# Patient Record
Sex: MALE | Race: WHITE | NOT HISPANIC OR LATINO | Employment: FULL TIME | URBAN - METROPOLITAN AREA
[De-identification: names, ages, dates, MRNs, and addresses within clinical notes are randomized per-mention and may not be internally consistent; named-entity substitution may affect disease eponyms.]

---

## 2023-07-19 ENCOUNTER — OFFICE VISIT (OUTPATIENT)
Dept: VASCULAR SURGERY | Facility: CLINIC | Age: 83
End: 2023-07-19
Payer: COMMERCIAL

## 2023-07-19 DIAGNOSIS — I83.892 HEMORRHAGE OF VARICOSE VEINS OF LEFT LOWER EXTREMITY: ICD-10-CM

## 2023-07-19 DIAGNOSIS — I83.899 SYMPTOMATIC SPIDER VARICOSE VEIN: Primary | ICD-10-CM

## 2023-07-19 DIAGNOSIS — I83.812 VARICOSE VEINS OF LEG WITH PAIN, LEFT: ICD-10-CM

## 2023-07-19 PROCEDURE — 99203 OFFICE O/P NEW LOW 30 MIN: CPT | Performed by: SURGERY

## 2023-07-19 RX ORDER — LATANOPROST 50 UG/ML
SOLUTION/ DROPS OPHTHALMIC
COMMUNITY
Start: 2023-06-17

## 2023-07-19 RX ORDER — SIMVASTATIN 10 MG
TABLET ORAL
COMMUNITY
Start: 2023-04-27

## 2023-07-19 RX ORDER — ASCORBIC ACID/MULTIVIT-MIN 1000 MG
EFFERVESCENT POWDER IN PACKET ORAL
COMMUNITY
Start: 2022-09-08

## 2023-07-19 RX ORDER — MULTIVITAMIN
TABLET ORAL
COMMUNITY

## 2023-07-19 RX ORDER — FLECAINIDE ACETATE 100 MG/1
100 TABLET ORAL EVERY 12 HOURS
COMMUNITY
Start: 2023-02-15

## 2023-07-19 RX ORDER — METOPROLOL SUCCINATE 50 MG/1
TABLET, EXTENDED RELEASE ORAL
COMMUNITY
Start: 2023-06-18

## 2023-07-19 RX ORDER — GLUCOSAMINE/CHONDR SU A SOD 750-600 MG
TABLET ORAL
COMMUNITY

## 2023-07-19 RX ORDER — DOXYCYCLINE HYCLATE 50 MG/1
50 CAPSULE ORAL AT BEDTIME
COMMUNITY

## 2023-07-19 ASSESSMENT — ENCOUNTER SYMPTOMS: IRREGULAR HEARTBEAT: 1

## 2023-07-19 NOTE — PROGRESS NOTES
VEIN SOLUTIONS CONSULT    Impression:   1.  Mid left pretibial cluster of spider veins with history of hemorrhage.    2.  Symptomatic left leg varicosities with pain and swelling.    3.  Atrial fibrillation anticoagulated with Eliquis.    Plan:  I had a detailed conversation with Jerry regarding all of the above.  I will arrange for a left leg venous competency study.  Based on physical exam I suspect that he has incompetence of the left greater saphenous vein.  He has a small eschar on the mid left pretibial region with some surrounding spider veins.  I will seek approval for medically necessary sclerotherapy given his history of hemorrhage.  He has utilized knee-high compression stockings of 20-30 mmHg pressure for greater than 3 months.  He will continue to utilize those stockings as we await his left leg venous competency study.  Ultimate treatment recommendations will be pending the outcome of that exam.        HPI:   Jerry Dubon is a healthy 82-year-old gentleman with longstanding history of slowly worsening left leg varicosities.  He has chronic atrial fibrillation and is anticoagulated with Eliquis.  2 months ago he was drying his legs off after a shower when he avulses a scab on the mid left pretibial region and had significant bleeding from that area.  Ultimately he went to a local emergency room where the bleeding was controlled with direct pressure and application of skin glue.    He has been self treating with a daily compression dressing utilizing gauze and Coban over the cluster of spider veins on the mid left pretibial region.  Additionally he has been utilizing a knee-high compression stocking of 20-30 mmHg pressure.  He has no prior history of venous intervention.      CURRENT MEDICATIONS  No current outpatient medications on file prior to visit.  No current facility-administered medications on file prior to visit.        PAST MEDICAL HISTORY  No past medical history on file.      PAST  SURGICAL HISTORY:  No past surgical history on file.    ALLERGIES   Not on File    FAMILY HISTORY  No family history on file.    SOCIAL HISTORY       ROS:   Review of Systems   Cardiovascular: Positive for irregular heartbeat and leg swelling.   All other systems reviewed and are negative.        EXAM:  There were no vitals taken for this visit.  Physical Exam  Constitutional:       Appearance: Normal appearance.   HENT:      Head: Normocephalic and atraumatic.   Eyes:      General: No scleral icterus.     Extraocular Movements: Extraocular movements intact.      Pupils: Pupils are equal, round, and reactive to light.   Cardiovascular:      Pulses:           Dorsalis pedis pulses are 2+ on the right side and 2+ on the left side.      Comments: Significant varicosities on the medial aspect of the left knee and calf.  2 mm eschar with some fine surrounding spider veins on the anterior aspect of the mid left pretibial region.  This was the area of prior hemorrhage.  Musculoskeletal:         General: Normal range of motion.   Skin:     General: Skin is warm and dry.   Neurological:      General: No focal deficit present.      Mental Status: He is alert and oriented to person, place, and time. Mental status is at baseline.   Psychiatric:         Mood and Affect: Mood normal.         Behavior: Behavior normal.         Thought Content: Thought content normal.         Judgment: Judgment normal.         Labs:  LIPID RESULTS:  No results found for: CHOL, HDL, LDL, TRIG, CHOLHDLRATIO    CBC RESULTS:  No results found for: WBC, RBC, HGB, HCT, MCV, MCH, MCHC, RDW, PLT    BMP RESULTS:  No results found for: NA, POTASSIUM, CHLORIDE, CO2, ANIONGAP, GLC, BUN, CR, GFRESTIMATED, GFRESTBLACK, CAMILA     A1C RESULTS:  No results found for: A1C      Imaging:  No results found for this or any previous visit (from the past 24 hour(s)).    Total length of this encounter was 30 minutes with time spent reviewing records, interviewing and  examining the patient, answering questions, and coordinating a treatment plan.          Deacon Cesar MD

## 2023-07-19 NOTE — PATIENT INSTRUCTIONS
Varicose Veins and Spider Veins    Varicose veins are swollen, enlarged veins most often found in the legs. They are usually blue or purple in color and may bulge, twist, and stand out under the skin. Spider veins are small veins just under your skin that can look red, blue or purple.        Normally, veins return blood from the body to the heart. The leg veins have one-way valves that prevent blood from flowing backward in the vein. When the valves are weak or damaged, blood backs up in the veins. This may cause some of the veins to swell and bulge and become varicose veins.     Symptoms  Varicose veins may or may not cause symptoms. If symptoms do occur, they can include:  Legs that feel tired, achy, heavy, or itchy  Leg swelling  Leg muscle cramps  Skin changes, such as discoloration, dryness, redness, or rash (in more severe cases, you may also have sores on the skin called venous leg ulcers)    Risk factors  There are a number of factors that increase the risk for varicose veins. These can include:   Being a woman  Being older  Sitting or standing for long periods  Being overweight  Being pregnant  Having a family history of varicose veins  Hormones, birth control pills    Treatment starts with simple self-help measures (see below). If these don't help, there are many procedures that can be done to shrink or remove varicose veins. Your healthcare provider can tell you more about these options, if needed.     Home care  Support or compression stockings will likely be prescribed. If so, be sure to wear them as directed. They may help improve blood flow.  Exercising helps strengthen your leg muscles and improve blood flow. To get the most benefit, choose exercises such as walking, swimming, or cycling. Also try to exercise for at least 30 minutes on most days.  Raising your legs above heart level will help relieve swelling and keep blood from pooling in veins. Try to elevate your legs for 15 to 20 minutes at  the end of the day, and whenever you're relaxing. To make sure your legs are raised above heart level, prop them up on cushions or large pillows.  To keep blood moving when you have to sit or stand for long periods, try these tips:  At work, take walking breaks instead of coffee breaks. Walk during your lunch hour. Or try flexing your feet up and down 10 times each hour.  When standing, raise yourself up and down on your toes, or rock back and forth on your heels.  If you are overweight, talk with your healthcare provider about setting up a weight-loss plan. Maintaining a healthy weight can help reduce the strain on your veins. It may also improve symptoms, such as swelling and aching.  If you have dryness and itching, ask your provider about special lotions that can be applied to the skin to help improve symptoms.     Follow-up care  Follow up with your healthcare provider, or as directed. If imaging tests were done, you'll be told the results and if there are any new findings that affect your care.     When to seek medical advice  Call your healthcare provider right away if any of these occur:  Sudden, severe leg swelling, pain, or redness  Symptoms worsen, or they don't improve with self-care  Bleeding from any affected veins  Ulcers form on the legs, ankles, or feet  Fever of 100.4 F (38 C) or higher, or as advised by your provider    Denny last reviewed this educational content on 4/1/2018 2000-2021 [employer]. All rights reserved. This information is not intended as a substitute for professional medical care. Always follow your healthcare professional's instructions.    Self-Care for Spider and Varicose Veins    Your healthcare provider may suggest that you try self-care. Exercising and maintaining a healthy weight may keep problem veins from getting worse. Wearing elastic stockings and elevating your legs can help improve blood flow. Taking breaks when you sit or stand helps,  too.        Wearing compression stockings  Compression stockings gently squeeze veins so blood flows upward. If you need compression stockings, your healthcare provider can prescribe them for you. Follow your healthcare provider's advice about how and when to wear them. Compression stockings come in several different levels of pressure. Ask your healthcare provider which level of pressure would help you the most.     Raising your legs above heart level will help relieve swelling and keep blood from pooling in veins. Try to elevate your legs for 15 to 20 minutes at the end of the day, and whenever you're relaxing. To make sure your legs are raised above heart level, prop them up on cushions or large pillows.    To keep blood moving when you have to sit or stand for long periods, try these tips:  At work, take walking breaks instead of coffee breaks. Walk during your lunch hour. Or try flexing your feet up and down 10 times each hour.  When standing, raise yourself up and down on your toes, or rock back and forth on your heels.    [employer] last reviewed this educational content on 11/1/2019 2000-2021 [employer]. All rights reserved. This information is not intended as a substitute for professional medical care. Always follow your healthcare professional's instructions.    Treatment Options    Sclerotherapy  Your healthcare provider will inject the vein with a special chemical that will quickly close the vein from the inside. This is particularly useful for spider veins and smaller varicose veins.      If you have large varicose veins, surgery may be the best choice. But it will not prevent new varicose veins from forming. Surgery is most often done in a surgery center or in the office.    If surgery is recommended for you, your surgery will be tailored to your needs. Varicose veins may be tied off (ligation), destroyed, or removed. Blood will then flow through the healthy veins. One or more of the  following techniques may be used:    Ablation (laser or radiofrequency)  A tiny cut in the skin is made near the varicose vein. A small tube called a catheter is inserted into the vein. Energy or heat released from the catheter tip will make the vein walls collapse and stick together, stopping all blood flow through the vein.         Ablation (glue)  A tiny cut in the skin is made near the varicose vein. A small tube called a catheter is inserted into the vein. Droplets of glue are deposited into the vein to make vein walls collapse and stick together stopping blood flow through the vein.    Microphlebectomy or ambulatory phlebectomy  A special hook is used to gently take out a varicose vein through tiny incisions. Microphlebectomy may be done in your healthcare provider's office.      Vein stripping and ligation (rare)  In more severe cases, the surgeon may tie off and remove veins by making smaller cuts in the skin. Smaller branching veins may also be tied off or removed.    Know about the risks  Your healthcare provider will talk with you about the risks of surgery. These include:  Bleeding or swelling  A sense of numbness, burning, or tingling in areas near the procedure  Edema or swelling in the legs  Clots in the deep veins that may travel to the lungs  Infection  Scarring  Inflammation related to the glue    [employer] last reviewed this educational content on 11/1/2019 (Sclerotherapy image) 12/1/2019 (Radiofrequency ablation image, Microphlebectomy image)    5594-8815 [employer]. All rights reserved. This information is not intended as a substitute for professional medical care. Always follow your healthcare professional's instructions.

## 2023-07-19 NOTE — NURSING NOTE
Patient Reported symptoms:    Bilateral leg   Heaviness None of the time   Achiness None of the time   Swelling Some of the time   Throbbing None of the time   Itching None of the time   Appearance Moderately noticeable   Impact on work/activities Symptoms but full able to participate

## 2023-07-19 NOTE — LETTER
7/19/2023         RE: Jerry Dubon  [address] [facility] [address]        Dear Colleague,    Thank you for referring your patient, Jerry Dubon, to the [facility] [place]. Please see a copy of my visit note below.    VEIN SOLUTIONS CONSULT    Impression:   1.  Mid left pretibial cluster of spider veins with history of hemorrhage.    2.  Symptomatic left leg varicosities with pain and swelling.    3.  Atrial fibrillation anticoagulated with Eliquis.    Plan:  I had a detailed conversation with Jerry regarding all of the above.  I will arrange for a left leg venous competency study.  Based on physical exam I suspect that he has incompetence of the left greater saphenous vein.  He has a small eschar on the mid left pretibial region with some surrounding spider veins.  I will seek approval for medically necessary sclerotherapy given his history of hemorrhage.  He has utilized knee-high compression stockings of 20-30 mmHg pressure for greater than 3 months.  He will continue to utilize those stockings as we await his left leg venous competency study.  Ultimate treatment recommendations will be pending the outcome of that exam.        HPI:   Jerry Dubon is a healthy 82-year-old gentleman with longstanding history of slowly worsening left leg varicosities.  He has chronic atrial fibrillation and is anticoagulated with Eliquis.  2 months ago he was drying his legs off after a shower when he avulses a scab on the mid left pretibial region and had significant bleeding from that area.  Ultimately he went to a local emergency room where the bleeding was controlled with direct pressure and application of skin glue.    He has been self treating with a daily compression dressing utilizing gauze and Coban over the cluster of spider veins on the mid left pretibial region.  Additionally he has been utilizing a knee-high compression stocking of 20-30 mmHg pressure.  He has no prior history  of venous intervention.      CURRENT MEDICATIONS  No current outpatient medications on file prior to visit.  No current facility-administered medications on file prior to visit.        PAST MEDICAL HISTORY  No past medical history on file.      PAST SURGICAL HISTORY:  No past surgical history on file.    ALLERGIES   Not on File    FAMILY HISTORY  No family history on file.    SOCIAL HISTORY       ROS:   Review of Systems   Cardiovascular: Positive for irregular heartbeat and leg swelling.   All other systems reviewed and are negative.        EXAM:  There were no vitals taken for this visit.  Physical Exam  Constitutional:       Appearance: Normal appearance.   HENT:      Head: Normocephalic and atraumatic.   Eyes:      General: No scleral icterus.     Extraocular Movements: Extraocular movements intact.      Pupils: Pupils are equal, round, and reactive to light.   Cardiovascular:      Pulses:           Dorsalis pedis pulses are 2+ on the right side and 2+ on the left side.      Comments: Significant varicosities on the medial aspect of the left knee and calf.  2 mm eschar with some fine surrounding spider veins on the anterior aspect of the mid left pretibial region.  This was the area of prior hemorrhage.  Musculoskeletal:         General: Normal range of motion.   Skin:     General: Skin is warm and dry.   Neurological:      General: No focal deficit present.      Mental Status: He is alert and oriented to person, place, and time. Mental status is at baseline.   Psychiatric:         Mood and Affect: Mood normal.         Behavior: Behavior normal.         Thought Content: Thought content normal.         Judgment: Judgment normal.         Labs:  LIPID RESULTS:  No results found for: CHOL, HDL, LDL, TRIG, CHOLHDLRATIO    CBC RESULTS:  No results found for: WBC, RBC, HGB, HCT, MCV, MCH, MCHC, RDW, PLT    BMP RESULTS:  No results found for: NA, POTASSIUM, CHLORIDE, CO2, ANIONGAP, GLC, BUN, CR, GFRESTIMATED,  CAMILA MENDOZA     A1C RESULTS:  No results found for: A1C      Imaging:  No results found for this or any previous visit (from the past 24 hour(s)).    Total length of this encounter was 30 minutes with time spent reviewing records, interviewing and examining the patient, answering questions, and coordinating a treatment plan.          Deacon Cesar MD            Again, thank you for allowing me to participate in the care of your patient.        Sincerely,        Deacon Cesar MD

## 2023-07-31 ENCOUNTER — ANCILLARY PROCEDURE (OUTPATIENT)
Dept: ULTRASOUND IMAGING | Facility: CLINIC | Age: 83
End: 2023-07-31
Attending: SURGERY
Payer: COMMERCIAL

## 2023-07-31 DIAGNOSIS — I83.892 HEMORRHAGE OF VARICOSE VEINS OF LEFT LOWER EXTREMITY: ICD-10-CM

## 2023-07-31 PROCEDURE — 93971 EXTREMITY STUDY: CPT | Mod: LT | Performed by: SURGERY

## 2023-08-02 ENCOUNTER — OFFICE VISIT (OUTPATIENT)
Dept: VASCULAR SURGERY | Facility: CLINIC | Age: 83
End: 2023-08-02
Payer: COMMERCIAL

## 2023-08-02 DIAGNOSIS — I83.892 HEMORRHAGE OF VARICOSE VEINS OF LEFT LOWER EXTREMITY: Primary | ICD-10-CM

## 2023-08-02 DIAGNOSIS — I83.812 VARICOSE VEINS OF LEG WITH PAIN, LEFT: ICD-10-CM

## 2023-08-02 DIAGNOSIS — I83.899 SYMPTOMATIC SPIDER VARICOSE VEIN: ICD-10-CM

## 2023-08-02 PROCEDURE — 99214 OFFICE O/P EST MOD 30 MIN: CPT | Performed by: SURGERY

## 2023-08-02 NOTE — PROGRESS NOTES
Jerry Dubon is a healthy 82-year-old gentleman with longstanding history of slowly worsening left leg varicosities.  Please see my prior consultation from his 2023 office visit.  He has chronic atrial fibrillation and is anticoagulated with Eliquis.  2 months ago he was drying his legs off after a shower when he avulses a scab on the mid left pretibial region.  He had associated significant hemorrhage that required a trip to the local emergency room.  Bleeding was controlled with direct pressure and application of skin glue.    Jerry has been self treating with a daily compression dressing utilizing gauze and Coban over the cluster of spider veins on the medial left pretibial region.  He has been utilizing knee-high compression stockings of 20-30 mmHg pressure for several months.  He notes aching, heaviness, and itching of his left leg despite wearing compression stockings.  He is significantly concerned about future recurrent hemorrhage especially since he remains on Eliquis.  I consider him to be a failure of conservative therapy.    Exam:  Well-developed gentleman alert and oriented x3.  Significant varicosities on the medial aspect of the left knee and calf.  1 mm eschar with some fine surrounding spider veins on the anterior aspect of the mid left pretibial region.  This was the area of prior hemorrhage.  2+ palpable dorsalis pedis pulses bilaterally.  The remainder of his physical examination was within normal limits.    Imaging:  Josey William on 2023  4:17 PM  Name:  Jerry Dubon                                                      Patient ID: [id number]  Date: 2023                                                     : 1940  Sex: male                                                                    Examined by: RAFFAELE William RVT  Age:  82 year old                                                         Reading MD: TAB     INDICATION:  Hemorrhage of varicose veins of  left lower extremity.     EXAM TYPE  LEFT LOWER EXTREMITY VENOUS DUPLEX FOR VENOUS INSUFFICIENCY TECHNICAL SUMMARY     A duplex ultrasound study using color flow was performed, to evaluate the left lower extremity veins for valvular incompetence with the patient in a steep reversed trendelenberg.      LEFT:     The deep veins demonstrate phasic flow, compress, and respond to augmentations.  There is no evidence of DVT.  The common femoral and proximal femoral veins are incompetent and free of thrombus. The remaining deep veins are competent and free of thrombus.      The GSV demonstrates phasic flow, compresses, and responds to augmentations from the saphenofemoral junction to the ankle with no evidence of thrombus. The greater saphenous vein measures 9.9 mm at the saphenofemoral junction, 11.9 mm in the proximal thigh, and 7.7 mm at the knee. The GSV is incompetent from the SFJ to the proximal calf with the greatest reflux time of 5361 milliseconds. The GSV gives rise to multiple incompetent varicose veins, the largest measures 7.5 mm off the proximal calf that courses medially with a reflux time of 1567 milliseconds.      The AASV is competent ( 2.1 mm) draining into the saphenofemoral junction.     The Giacomini vein is competent ( 0.7 mm) communicating with the small saphenous vein at the knee level.      The SSV demonstrates phasic flow, compresses, and responds to augmentations from the popliteal space to the ankle.  No reflux or thrombus is seen. The saphenopopliteal junction is absent.     Perforators: There is an incompetent  vein ( 4.9 mm) at 10 cm from medial malleolus that communicates with a varicose vein. A second incompetent  vein (2.8 mm) is found 4 cm below the medial knee crease in the proximal calf that communicates with a varicose vein.     RIGHT:     The CFV demonstrates phasic flow, compresses, and responds to augmentations.  There is no DVT.       FINAL SUMMARY:  1.          No evidence of left lower extremity deep vein thrombus.  2.         Left common femoral and proximal femoral vein incompetence.  3.         Left greater saphenous vein incompetence.  4.        Multiple left incompetent  veins.  5.        Left varicose vein incompetence.     Incompetence Criteria:  Greater than 500 milliseconds of reflux measured in the superficial and perforating veins ad greater than 1000 milliseconds of reflux measured in the deep veins.    ASSESSMENT:  1.  Symptomatic left leg varicosities and left leg spider veins with recent history of spontaneous left leg hemorrhage from the mid left pretibial region necessitating a trip to the emergency room.  He remains symptomatic despite wearing knee-high compression stockings for greater than 3 months.  He has incompetence of the left greater saphenous vein from the saphenofemoral junction to the proximal left calf.  That vein measures 9.9 mm in diameter at the junction and 7.7 mm in diameter at the knee.  I consider him to be a failure of conservative therapy.    2.  Chronic atrial fibrillation on Eliquis.    RECOMMENDATION:  I reviewed all the above with Jerry.  I recommend radiofrequency ablation of the left leg greater saphenous vein with medically necessary left leg stab phlebectomies and medically necessary left leg sclerotherapy of pretibial spider veins.  I have thoroughly discussed the specifics of the above-noted procedures including potential complications and the anticipated postoperative course.  He verbalizes full understanding to the above and a desire to proceed.  He is an avid  and will likely hold off on this procedure until sometime in October.  He will continue to utilize knee-high compression stockings on a daily basis.  Pending preauthorization from his insurance company I would hope to schedule the above-noted procedure sometime this fall.  He will need to hold his Eliquis (A-fib) for 24 hours prior to this  procedure.    Total length of this encounter was 30 minutes with time spent reviewing studies, interviewing and examining the patient, answering questions, and coordinating a treatment plan.    Nelson Cesar MD

## 2023-08-02 NOTE — PATIENT INSTRUCTIONS
Pre-Procedure Instructions:                           VNUS Closure and Phlebectomies   You are having a Closure(s) - where one or more veins are closed and Phlebectomies - where one or more veins are removed.   Insurance  Precertification and/or referral authorization may be required by your insurance company.  We will call your insurance company to verify benefits for the medically necessary part of your procedure.    Your Current Medications and Allergies  To reduce bruising, please do not take aspirin-type medications (Motrin, Aleve, Ibuprofen, Advil, etc.) for three days before your procedure. You may take Tylenol if you need a pain reliever.  Are you on blood thinner medications? (Plavix, Coumadin, Eliquis, Xarelto) Please discuss this with your surgeon. You may resume taking your blood thinner medication after your procedure.  Are you sensitive to latex or adhesives used for fake fingernails? Please let us know!    Driving Escort and   Please arrange to have a trusted adult (18 years old or older) drive you to and from the clinic.  For your safety, we recommend you have a trusted adult to stay with you until the next morning.    Your Health  If you have a change in your health before the procedure, contact our office immediately. (For example: cold symptoms, cough, urinary tract infection, fever, flu symptoms.)  A pre-procedure physical is not required.    Note  It is sometimes necessary to adjust the procedure schedule due to emergencies. We greatly appreciate your flexibility and understanding in this matter.                  Check List: The Morning of Your Procedure  ___1. Please do not put anything on your leg(s) or shave the day of your procedure.  ___2. You may take your normal medications the day of your procedure.  ___3. It is recommended you eat a light breakfast or lunch the day of your procedure.  ___4. Wear comfortable loose-fitting clothing and wide-fitting shoes (i.e. tennis shoes,  slip-ons).  ___5. Please arrive at our clinic at the specified time given by the nurse.  ___6. You will sign an affirmation of informed consent.  ___7. Bring your pre-procedure sedation medication (lorazepam and clonidine) with you to the clinic.              One hour before your procedure, you will be instructed to take these medications. The lorazepam              (Ativan) lowers anxiety and sedates you; the clonidine makes the lorazepam more effective. Everyone's              body processes these medications differently. Therefore, reactions to these medications vary. Some              people stay awake and some people sleep through the whole procedure. You may not remember              everything about the procedure or the day. You do not want to make any big decisions for the rest of the              day.    The Day of Your Procedure:       VNUS Closure and Phlebectomies  In the Exam Room  A nurse will bring you back to an exam room with your family member or friend. This is when your informed consent will be signed, and you will take your pre-procedure medications.  You will be asked to remove everything from the waist down, including undergarments. You will then put on a hospital gown or shorts and blue booties.  Your surgeon will come in to answer any questions and harry any bulging varicose veins to be removed.  You will be taken to the restroom to empty your bladder before going into the procedure room.    In the Procedure Room  You will be escorted to the procedure room. You will lie on a procedure table covered with a sheet or blanket.  A nurse will put a blood pressure cuff on your arm and a pulse/oxygen monitor on a finger. Your vital signs will be monitored every 15 minutes.  Your gown will be pulled up slightly and the groin exposed for a short period of time. The surgeon's assistant will clean your foot, leg, and groin with an antibacterial solution. We will get you covered up as quickly as  possible!  Sterile towels and blue drapes will be used to cover you and the table. You will be asked to keep your hands under the blue drapes during the procedure.  The lights will be turned down. The table will be tipped so your head is higher than your feet. You may feel like you're going to slide off, but you won't.    The Procedure  The surgeon will visualize your veins with an ultrasound machine. He or she will then numb your skin and access the vein. A catheter is passed up the vein and positioned with ultrasound guidance. The table will then be tipped head down.  Once the catheter is in the correct position, medication will be injected to numb your leg. You will feel some needle sticks and may feel discomfort as the medication goes in. Once this is done, you should not experience significant discomfort. But if you do, please let us know and more numbing medication can be injected. As the catheter sends out heat, the vein closes off and the catheter is withdrawn.  For the phlebectomy part of the procedure, small incisions are made where the bulging varicose veins have been marked on your leg(s) and these veins will be removed using a small stefan hook instrument.    Post-Procedure  Once the procedure is done, your leg(s) will be washed with warm water and dried. Your leg(s) will be bandaged with large soft dressings and a large ACE bandage wrapped from toes to groin.   You will be offered something to drink and a light snack.  You will rest with your leg(s) elevated for approximately 30 minutes. Your friend or family member may join you.  For your safety, you will be taken to your car in a wheelchair. If you are able to, it is good to keep your leg(s) elevated on the car ride home.    Post-Procedure Instructions:             VNUS Closure and Phlebectomies      Post-Op Day Zero - The Day of Your Procedure:0  1. Medication for Pain Control and Inflammation Control   - The numbing medication injected during your  procedure will last for several hours. The pre-procedure                 tablets may make you very sleepy and you might not remember everything from the procedure or from                 the day. This will usually wear off by the next day.   - Ibuprofen:  If tolerated, take ibuprofen (e.g., Advil) to reduce inflammation whether or not you have                 pain. For three days, take two tablets (200 mg each) with every meal and at bedtime with a snack. If                 your pain is not controlled with ibuprofen, you may take prescription pain medication (such as Norco),                 if prescribed.   - You may resume taking any medications you were taking before your procedure.  2. Activity   - Rest with your leg(s) elevated above your heart. This will prevent from a lot of swelling and                 bleeding. You do not need to elevate your leg(s) while sleeping at night. You may go upstairs, sit up to                 eat, use the bathroom, and take several five-minute walks. Otherwise, keep your leg(s) elevated.                 Minimize the amount of time you are up on your feet to about 30 minutes at a time.  3. Bandages   - The incision sites will be covered with soft bandages and an ACE wrap. Keep your bandages on                 and dry for 48 hours. The ACE should provide  snug  compression but should not cause pain or                 numbness in the toes. If you have significant discomfort or your toes become cold or numb, unwrap                 your ACE and rewrap with less tension starting at the toes wrapping upward.  4. Incisions   - Bleeding: You may see some incision sites that are oozing through the bandages. This is not unusual      and can be managed with Rest, Ice, Compression and Elevation (RICE). Apply ice and firm pressure      directly to the site that is bleeding and rest with your leg(s) elevated above your heart for 20-30                 minutes.    Post-Op Day One:  1.  Medication   - Ibuprofen: Continue the same as the Day of Your Procedure. If your pain is not controlled with                 ibuprofen, you may take prescription pain medication (such as Norco), if prescribed.   2. Activity   - We would like you to get up at least six times and walk around for short periods of time, unless it is      causing you pain. You should not be on your feet more than 90 minutes at a time. Elevate your leg      above your heart when you are not walking.  3. Bandages   - Your bandages must be kept on and dry for 48 hours.  4. Driving   - You may resume driving when you can do so safely. Do not drive if you are taking narcotic pain      medication.  Post-Op Day Two:   1. Medication  - Ibuprofen: Continue the same as the Day of Your Procedure.  2.  Activity  - Walk as tolerated. Elevate as much as possible when not walking.  3. Bandages and Compression  - Remove ACE wrap and padding. Shower and put on your compression hose during waking hours only       for at least 5 days. (Your doctor may instruct you to keep your bandages on until your return     appointment; please follow your doctor's instructions.)  4. Incisions  - Your leg(s) will be bruised; there may be swelling, hard knots under the skin and possibly some     numbness. These will likely resolve over time. If you see  hair-like  strings coming out of your     incisions, do not pull them (this will only cause pain/discomfort). We will trim them when you come     back for your follow-up appointment.  5. Call Us If:   - You see any areas on your leg that are red and angry in appearance.   - You notice any drainage that is milky or cloudy in appearance or that has a foul odor.   - You run a temperature of 100.5 or greater.    Post-Op Day Three:  You will have a follow up appointment 2-4 days post-procedure. At this appointment, you will have an ultrasound and we will check your incisions.     ________________________________________________________________________________________    The Two Weeks Following Your Procedure  1.  Skin Care   - Do not use any lotions, creams or powders on your incisions for 14 days or until the incisions have                 healed.   - Do not soak in a bathtub, hot tub or go swimming for 14 days or until your incisions have healed.  2.  Medications   - You may use ibuprofen or acetaminophen (e.g., Tylenol) as needed for pain or discomfort.  3.  Activity   - Do not lift over 25 pounds. After about two weeks you may resume exercise such as aerobics,                 running, tennis or weightlifting. Use your common sense and ease back into your exercise routine                 slowly.   - You may feel a cord-like tightness along the inside of your leg. Gentle stretching can be helpful.  4. Compression Hose   - Your doctor may instruct you to wear compression for longer than seven days; please follow your                 doctor's instructions. As a comfort measure, you may choose to wear compression for longer than                 required.  5.  Travel   - Do not fly in an airplane for 14 days after your procedure. If you have a long car trip planned within                 two to three weeks following your procedure, stop and walk for a few minutes every two hours.      Periodic ankle pumps during the ride may be helpful.    Six Week Appointment  - At your six-week appointment, you will see your surgeon for an exam and evaluation. This office visit     will be scheduled when you return for post-op day three return appointment.       Return to Work  1.  If you work outside the home, you may return to work in a few days depending on the extent of your        procedure, how you tolerate it, and the type of work you perform.  2.  Paperwork: If your employer requires paperwork or you would like a letter written to your employer, please        let us know. We will complete  disability type forms at no charge. Please allow five business days for forms        to be completed.          SCLEROTHERAPY AFTER CARE  Immediately:  After treatment, walk for 10-15 minutes before getting in your car.  If your trip home is more than 1 hour, stop and walk around for 5-10 minutes. Avoid sitting or standing for extended periods.   First 24 hours: Wear your compression continuously, even while in bed. After the 24 hours, you may shower if you want to. Put your hose back on, unless you are going to bed. You should NOT wear compression to bed after the first 24 hours. You may fly the next day, but wear your compression.   For 5 days: Wear the compression hose for waking hours only. You may continue to wear them longer than 5 days if you prefer.   For days 5-7: Walking is encouraged, as it promotes efficient circulation in your veins. You may do activities that raise your heart rate, but do NOT run, jog, do high impact aerobics, or weight lifting. After 7 days, no activity restrictions.    Shaving: Wait a few days to shave or apply lotion.   Bathing: Do NOT take hot baths or sit in a hot tub for 7-10 days.    For 1 year: Wear SPF 30 sunscreen on your legs when in the sun. This is very important! It helps prevent darkening of the skin at the injection sites.   Medications: You may resume your usual medications, including aspirin or ibuprofen.    Common Things to Expect  -  Compression must be worn for the first 24 hours and then during the day for 5-7 days.    -  If larger veins are treated with ultrasound-guided sclerotherapy, you will have redness, firmness, tenderness, and swelling.  This firmness and tenderness may take 3-6 months to resolve. Ibuprofen and compression hose will aid in this process.    -  You will have bruising that can last up to 3 weeks. Most fading of the veins will occur between 3 and 6 weeks after treatment.    -  You may notice brown discoloration (hyperpigmentation) at the  treatment site.  This should fade with time, but will take 3 months to 1 year to fully heal.     -  Some treated veins may look darker because of trapped blood within the vein. This trapped blood can be removed at a minimum of 1 month following treatment. Larger veins are more likely to develop trapped blood.    -  It is very important for you to use at least SPF 30 sunscreen in order to help prevent the discoloration of your skin.    -  Migraines rarely occur following sclerotherapy, but are more likely in patients with a history of migraines.  Treat as you would any other migraine.

## 2023-08-02 NOTE — Clinical Note
8/2/2023         RE: Jerry Dubon  [address] [address]        Dear Colleague,    Thank you for referring your patient, Jerry Dubon, to the [facility] [place]. Please see a copy of my visit note below.      August 2, 2023    Vein Procedure Recommendation    Spoke with patient in clinic.    Dr. Cesar has recommended patient to have the following vein procedure(s):     1. Left leg VNUS closure GSV, 1 unit Phlebectomies (medically necessary), and Sclerotherapy (medically necessary) 2-3 treatments        Patient is recommended to wear Thigh High compression hose following his procedure. Discussed compression hose. Explained to patient if insurance doesn't cover the compression hose there are a couple different options to getting them and to call the clinic to let us know if they need help.    Handed patient written procedure instructions to review on his own (see After Visit Summary).    Next steps:    Insurance Submission  Informed patient this process could take up to 14 business days, but once approved, the patient will be contacted by our surgery scheduler to schedule the above procedure. Gave patient our surgery scheduler's information.    Patient is in agreement with all of the above and has no further questions at this time.    Lilly Malcolm RN  [facility]  Vein Clinic    Jerry Dubon is a healthy 82-year-old gentleman with longstanding history of slowly worsening left leg varicosities.  Please see my prior consultation from his 7/19/2023 office visit.  He has chronic atrial fibrillation and is anticoagulated with Eliquis.  2 months ago he was drying his legs off after a shower when he avulses a scab on the mid left pretibial region.  He had associated significant hemorrhage that required a trip to the local emergency room.  Bleeding was controlled with direct pressure and application of skin glue.    Jerry has been self treating with a daily  compression dressing utilizing gauze and Coban over the cluster of spider veins on the medial left pretibial region.  He has been utilizing knee-high compression stockings of 20-30 mmHg pressure for several months.  He notes aching, heaviness, and itching of his left leg despite wearing compression stockings.  He is significantly concerned about future recurrent hemorrhage especially since he remains on Eliquis.  I consider him to be a failure of conservative therapy.    Exam:  Well-developed gentleman alert and oriented x3.  Significant varicosities on the medial aspect of the left knee and calf.  1 mm eschar with some fine surrounding spider veins on the anterior aspect of the mid left pretibial region.  This was the area of prior hemorrhage.  2+ palpable dorsalis pedis pulses bilaterally.  The remainder of his physical examination was within normal limits.    Imaging:  Josey William on 2023  4:17 PM  Name:  Jerry Dubon                                                      Patient ID: [id number]  Date: 2023                                                     : 1940  Sex: male                                                                    Examined by: RAFFAELE William RVT  Age:  82 year old                                                         Reading MD: TAB     INDICATION:  Hemorrhage of varicose veins of left lower extremity.     EXAM TYPE  LEFT LOWER EXTREMITY VENOUS DUPLEX FOR VENOUS INSUFFICIENCY TECHNICAL SUMMARY     A duplex ultrasound study using color flow was performed, to evaluate the left lower extremity veins for valvular incompetence with the patient in a steep reversed trendelenberg.      LEFT:     The deep veins demonstrate phasic flow, compress, and respond to augmentations.  There is no evidence of DVT.  The common femoral and proximal femoral veins are incompetent and free of thrombus. The remaining deep veins are competent and free of thrombus.      The GSV  demonstrates phasic flow, compresses, and responds to augmentations from the saphenofemoral junction to the ankle with no evidence of thrombus. The greater saphenous vein measures 9.9 mm at the saphenofemoral junction, 11.9 mm in the proximal thigh, and 7.7 mm at the knee. The GSV is incompetent from the SFJ to the proximal calf with the greatest reflux time of 5361 milliseconds. The GSV gives rise to multiple incompetent varicose veins, the largest measures 7.5 mm off the proximal calf that courses medially with a reflux time of 1567 milliseconds.      The AASV is competent ( 2.1 mm) draining into the saphenofemoral junction.     The Giacomini vein is competent ( 0.7 mm) communicating with the small saphenous vein at the knee level.      The SSV demonstrates phasic flow, compresses, and responds to augmentations from the popliteal space to the ankle.  No reflux or thrombus is seen. The saphenopopliteal junction is absent.     Perforators: There is an incompetent  vein ( 4.9 mm) at 10 cm from medial malleolus that communicates with a varicose vein. A second incompetent  vein (2.8 mm) is found 4 cm below the medial knee crease in the proximal calf that communicates with a varicose vein.     RIGHT:     The CFV demonstrates phasic flow, compresses, and responds to augmentations.  There is no DVT.       FINAL SUMMARY:  1.         No evidence of left lower extremity deep vein thrombus.  2.         Left common femoral and proximal femoral vein incompetence.  3.         Left greater saphenous vein incompetence.  4.        Multiple left incompetent  veins.  5.        Left varicose vein incompetence.     Incompetence Criteria:  Greater than 500 milliseconds of reflux measured in the superficial and perforating veins ad greater than 1000 milliseconds of reflux measured in the deep veins.    ASSESSMENT:  1.  Symptomatic left leg varicosities and left leg spider veins with recent history of  spontaneous left leg hemorrhage from the mid left pretibial region necessitating a trip to the emergency room.  He remains symptomatic despite wearing knee-high compression stockings for greater than 3 months.  He has incompetence of the left greater saphenous vein from the saphenofemoral junction to the proximal left calf.  That vein measures 9.9 mm in diameter at the junction and 7.7 mm in diameter at the knee.  I consider him to be a failure of conservative therapy.    2.  Chronic atrial fibrillation on Eliquis.    RECOMMENDATION:  I reviewed all the above with Jerry.  I recommend radiofrequency ablation of the left leg greater saphenous vein with medically necessary left leg stab phlebectomies and medically necessary left leg sclerotherapy of pretibial spider veins.  I have thoroughly discussed the specifics of the above-noted procedures including potential complications and the anticipated postoperative course.  He verbalizes full understanding to the above and a desire to proceed.  He is an avid  and will likely hold off on this procedure until sometime in October.  He will continue to utilize knee-high compression stockings on a daily basis.  Pending preauthorization from his insurance company I would hope to schedule the above-noted procedure sometime this fall.  He will need to hold his Eliquis (A-fib) for 24 hours prior to this procedure.    Total length of this encounter was 30 minutes with time spent reviewing studies, interviewing and examining the patient, answering questions, and coordinating a treatment plan.    Nelson Cesar MD           VEIN CLINIC LEG DRAWING:                Again, thank you for allowing me to participate in the care of your patient.        Sincerely,        Deacon Cesar MD

## 2023-08-02 NOTE — PROGRESS NOTES
August 2, 2023    Vein Procedure Recommendation    Spoke with patient in clinic.    Dr. Cesar has recommended patient to have the following vein procedure(s):     1. Left leg VNUS closure GSV, 1 unit Phlebectomies (medically necessary), and Sclerotherapy (medically necessary) 2-3 treatments        Patient is recommended to wear Thigh High compression hose following his procedure. Discussed compression hose. Explained to patient if insurance doesn't cover the compression hose there are a couple different options to getting them and to call the clinic to let us know if they need help.    Handed patient written procedure instructions to review on his own (see After Visit Summary).    Next steps:    Insurance Submission  Informed patient this process could take up to 14 business days, but once approved, the patient will be contacted by our surgery scheduler to schedule the above procedure. Gave patient our surgery scheduler's information.    Patient is in agreement with all of the above and has no further questions at this time.    Lilly Malcolm RN  [facility]  Vein Clinic